# Patient Record
Sex: MALE | Race: OTHER | NOT HISPANIC OR LATINO
[De-identification: names, ages, dates, MRNs, and addresses within clinical notes are randomized per-mention and may not be internally consistent; named-entity substitution may affect disease eponyms.]

---

## 2017-09-14 PROBLEM — Z00.00 ENCOUNTER FOR PREVENTIVE HEALTH EXAMINATION: Status: ACTIVE | Noted: 2017-09-14

## 2021-10-27 ENCOUNTER — TRANSCRIPTION ENCOUNTER (OUTPATIENT)
Age: 71
End: 2021-10-27

## 2021-10-28 ENCOUNTER — OUTPATIENT (OUTPATIENT)
Dept: OUTPATIENT SERVICES | Facility: HOSPITAL | Age: 71
LOS: 1 days | Discharge: HOME | End: 2021-10-28

## 2021-11-10 ENCOUNTER — TRANSCRIPTION ENCOUNTER (OUTPATIENT)
Age: 71
End: 2021-11-10

## 2021-11-11 ENCOUNTER — OUTPATIENT (OUTPATIENT)
Dept: OUTPATIENT SERVICES | Facility: HOSPITAL | Age: 71
LOS: 1 days | Discharge: HOME | End: 2021-11-11

## 2021-11-24 ENCOUNTER — APPOINTMENT (OUTPATIENT)
Dept: ORTHOPEDIC SURGERY | Facility: CLINIC | Age: 71
End: 2021-11-24
Payer: COMMERCIAL

## 2021-11-24 VITALS
HEART RATE: 74 BPM | SYSTOLIC BLOOD PRESSURE: 130 MMHG | WEIGHT: 140 LBS | DIASTOLIC BLOOD PRESSURE: 86 MMHG | BODY MASS INDEX: 21.22 KG/M2 | HEIGHT: 68 IN

## 2021-11-24 DIAGNOSIS — Z78.9 OTHER SPECIFIED HEALTH STATUS: ICD-10-CM

## 2021-11-24 DIAGNOSIS — Z86.79 PERSONAL HISTORY OF OTHER DISEASES OF THE CIRCULATORY SYSTEM: ICD-10-CM

## 2021-11-24 DIAGNOSIS — M25.512 PAIN IN LEFT SHOULDER: ICD-10-CM

## 2021-11-24 DIAGNOSIS — Z85.51 PERSONAL HISTORY OF MALIGNANT NEOPLASM OF BLADDER: ICD-10-CM

## 2021-11-24 PROCEDURE — 99203 OFFICE O/P NEW LOW 30 MIN: CPT

## 2021-11-24 RX ORDER — LISINOPRIL 30 MG/1
TABLET ORAL
Refills: 0 | Status: ACTIVE | COMMUNITY

## 2021-11-24 RX ORDER — ATORVASTATIN CALCIUM 80 MG/1
TABLET, FILM COATED ORAL
Refills: 0 | Status: ACTIVE | COMMUNITY

## 2022-02-01 PROBLEM — M67.912 TENDINOPATHY OF LEFT ROTATOR CUFF: Status: ACTIVE | Noted: 2021-11-24

## 2022-02-01 PROBLEM — M75.42 SHOULDER IMPINGEMENT SYNDROME, LEFT: Status: ACTIVE | Noted: 2021-11-24

## 2022-02-01 NOTE — ASSESSMENT
[FreeTextEntry1] : 71-year-old left-hand-dominant gentleman with several months of left shoulder pain c/w rotator cuff tendinopathy.  He could have a rotator cuff tear given the way he is lifting his arm and some of the weakness.  There is also jocelyne
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
## 2022-02-01 NOTE — PHYSICAL EXAM
[UE] : Sensory: Intact in bilateral upper extremities [Rad] : radial 2+ and symmetric bilaterally [Normal RUE] : Right Upper Extremity: No scars, rashes, lesions, ulcers, skin intact [Normal LUE] : Left Upper Extremity: No scars, rashes, lesions, ulcers, skin intact [Normal Touch] : sensation intact for touch [Normal] : Oriented to person, place, and time, insight and judgement were intact and the affect was normal [de-identified] : Shoulders\par No edema, ecchymoses, erythema, deformity.\par Positive Neer and Gil left shoulder.\par Painful range of motion on the left with decreased range of motion globally.  On the left there is approximately 165 degrees forward elevation, internal rotation to L2.  With the arm at the side there is approximately 35 degrees external rotation compared to about 60 degrees on the right.\par Painful arc on the left and shoulder shrug.\par 5 -/5 supraspinatus, internal and external rotation left shoulder with mild pain.\par Normal neurovascular exam upper extremity [de-identified] : \par X-rays of the left shoulder AP, Y lateral and axillary views today showed\par \par

## 2022-02-01 NOTE — PHYSICAL EXAM
[UE] : Sensory: Intact in bilateral upper extremities [Rad] : radial 2+ and symmetric bilaterally [Normal RUE] : Right Upper Extremity: No scars, rashes, lesions, ulcers, skin intact [Normal LUE] : Left Upper Extremity: No scars, rashes, lesions, ulcers, skin intact [Normal Touch] : sensation intact for touch [Normal] : Oriented to person, place, and time, insight and judgement were intact and the affect was normal [de-identified] : Shoulders\par No edema, ecchymoses, erythema, deformity.\par Positive Neer and Gil left shoulder.\par Painful range of motion on the left with decreased range of motion globally.  On the left there is approximately 165 degrees forward elevation, internal rotation to L2.  With the arm at the side there is approximately 35 degrees external rotation compared to about 60 degrees on the right.\par Painful arc on the left and shoulder shrug.\par 5 -/5 supraspinatus, internal and external rotation left shoulder with mild pain.\par Normal neurovascular exam upper extremity [de-identified] : \par X-rays of the left shoulder AP, Y lateral and axillary views today were unremarkable aside from possible ac joint narrowing\par \par

## 2022-02-01 NOTE — HISTORY OF PRESENT ILLNESS
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  \par His pain is about 5 out of 10 intermittently worse with certain movements.  He occasionally takes ibuprofen but nothing regularly.  He has not done any treatment for it.\par

## 2022-02-01 NOTE — ASSESSMENT
[FreeTextEntry1] : 71-year-old left-hand-dominant gentleman with several months of left shoulder pain.\par He likely has some rotator cuff tendinopathy.  He could have a rotator cuff tear given the way he is lifting his arm and some of the weakness.  There is also some global loss of motion.  I suspected more arthritis than the x-rays revealed.  Perhaps he has some adhesive capsulitis.  I would like to have him try some physical therapy and anti-inflammatories and see how this evolves over time.  Hopefully they can get back better motion and strength.  If he is not getting better I would like for him to get an MRI before the next visit in about 6 to 8 weeks.

## 2022-02-02 ENCOUNTER — APPOINTMENT (OUTPATIENT)
Dept: ORTHOPEDIC SURGERY | Facility: CLINIC | Age: 72
End: 2022-02-02

## 2022-02-02 DIAGNOSIS — M75.42 IMPINGEMENT SYNDROME OF LEFT SHOULDER: ICD-10-CM

## 2022-02-02 DIAGNOSIS — M67.912 UNSPECIFIED DISORDER OF SYNOVIUM AND TENDON, LEFT SHOULDER: ICD-10-CM

## 2025-04-22 ENCOUNTER — APPOINTMENT (OUTPATIENT)
Dept: ORTHOPEDIC SURGERY | Facility: CLINIC | Age: 75
End: 2025-04-22
Payer: COMMERCIAL

## 2025-04-22 VITALS — BODY MASS INDEX: 22.73 KG/M2 | HEIGHT: 68 IN | WEIGHT: 150 LBS

## 2025-04-22 DIAGNOSIS — Z80.9 FAMILY HISTORY OF MALIGNANT NEOPLASM, UNSPECIFIED: ICD-10-CM

## 2025-04-22 DIAGNOSIS — M72.2 PLANTAR FASCIAL FIBROMATOSIS: ICD-10-CM

## 2025-04-22 DIAGNOSIS — Z86.39 PERSONAL HISTORY OF OTHER ENDOCRINE, NUTRITIONAL AND METABOLIC DISEASE: ICD-10-CM

## 2025-04-22 PROCEDURE — 99203 OFFICE O/P NEW LOW 30 MIN: CPT

## 2025-04-22 PROCEDURE — 73650 X-RAY EXAM OF HEEL: CPT | Mod: LT

## 2025-04-22 RX ORDER — ROSUVASTATIN CALCIUM 5 MG/1
TABLET, FILM COATED ORAL
Refills: 0 | Status: ACTIVE | COMMUNITY